# Patient Record
Sex: FEMALE | Race: WHITE
[De-identification: names, ages, dates, MRNs, and addresses within clinical notes are randomized per-mention and may not be internally consistent; named-entity substitution may affect disease eponyms.]

---

## 2017-01-10 PROBLEM — L84 CORN OF TOE: Status: ACTIVE | Noted: 2017-01-10

## 2017-01-10 PROBLEM — M24.574 CONTRACTURE OF JOINT OF FOOT, RIGHT: Status: ACTIVE | Noted: 2017-01-10

## 2017-01-10 PROBLEM — M20.41 HAMMER TOE OF RIGHT FOOT: Status: ACTIVE | Noted: 2017-01-10

## 2018-05-07 LAB
ANALYZER ANC (IANC): NORMAL
ANION GAP SERPL CALC-SCNC: 9 MMOL/L (ref 10–20)
BASOPHILS # BLD: 0 THOUSAND/MCL (ref 0–0.3)
BASOPHILS NFR BLD: 0 %
BUN SERPL-MCNC: 13 MG/DL (ref 6–20)
BUN/CREAT SERPL: 17 (ref 7–25)
CALCIUM SERPL-MCNC: 9.2 MG/DL (ref 8.4–10.2)
CHLORIDE: 105 MMOL/L (ref 98–107)
CO2 SERPL-SCNC: 30 MMOL/L (ref 21–32)
CREAT SERPL-MCNC: 0.77 MG/DL (ref 0.51–0.95)
DIFFERENTIAL METHOD BLD: NORMAL
EOSINOPHIL # BLD: 0.1 THOUSAND/MCL (ref 0.1–0.5)
EOSINOPHIL NFR BLD: 1 %
ERYTHROCYTE [DISTWIDTH] IN BLOOD: 13.8 % (ref 11–15)
GLUCOSE SERPL-MCNC: 113 MG/DL (ref 65–99)
HEMATOCRIT: 39.8 % (ref 36–46.5)
HGB BLD-MCNC: 13.3 GM/DL (ref 12–15.5)
LYMPHOCYTES # BLD: 1.1 THOUSAND/MCL (ref 1–4)
LYMPHOCYTES NFR BLD: 15 %
MAGNESIUM SERPL-MCNC: 2.1 MG/DL (ref 1.7–2.4)
MCH RBC QN AUTO: 30.7 PG (ref 26–34)
MCHC RBC AUTO-ENTMCNC: 33.4 GM/DL (ref 32–36.5)
MCV RBC AUTO: 91.9 FL (ref 78–100)
MONOCYTES # BLD: 0.7 THOUSAND/MCL (ref 0.3–0.9)
MONOCYTES NFR BLD: 10 %
NEUTROPHILS # BLD: 5.1 THOUSAND/MCL (ref 1.8–7.7)
NEUTROPHILS NFR BLD: 74 %
NEUTS SEG NFR BLD: NORMAL %
PERCENT NRBC: NORMAL
PLATELET # BLD: 209 THOUSAND/MCL (ref 140–450)
POTASSIUM SERPL-SCNC: 4.1 MMOL/L (ref 3.4–5.1)
PROCALCITONIN SERPL IA-MCNC: <0.05 NG/ML
RBC # BLD: 4.33 MILLION/MCL (ref 4–5.2)
SODIUM SERPL-SCNC: 140 MMOL/L (ref 135–145)
WBC # BLD: 7 THOUSAND/MCL (ref 4.2–11)

## 2018-05-08 ENCOUNTER — HOSPITAL (OUTPATIENT)
Dept: OTHER | Age: 83
End: 2018-05-08
Attending: HOSPITALIST

## 2018-05-08 LAB
ANALYZER ANC (IANC): NORMAL
BASOPHILS # BLD: 0 THOUSAND/MCL (ref 0–0.3)
BASOPHILS NFR BLD: 0 %
DIFFERENTIAL METHOD BLD: NORMAL
EOSINOPHIL # BLD: 0.1 THOUSAND/MCL (ref 0.1–0.5)
EOSINOPHIL NFR BLD: 2 %
ERYTHROCYTE [DISTWIDTH] IN BLOOD: 13.6 % (ref 11–15)
HEMATOCRIT: 36.5 % (ref 36–46.5)
HGB BLD-MCNC: 12 GM/DL (ref 12–15.5)
LYMPHOCYTES # BLD: 1.9 THOUSAND/MCL (ref 1–4)
LYMPHOCYTES NFR BLD: 27 %
MCH RBC QN AUTO: 29.9 PG (ref 26–34)
MCHC RBC AUTO-ENTMCNC: 32.9 GM/DL (ref 32–36.5)
MCV RBC AUTO: 90.8 FL (ref 78–100)
MONOCYTES # BLD: 0.7 THOUSAND/MCL (ref 0.3–0.9)
MONOCYTES NFR BLD: 10 %
NEUTROPHILS # BLD: 4.3 THOUSAND/MCL (ref 1.8–7.7)
NEUTROPHILS NFR BLD: 61 %
NEUTS SEG NFR BLD: NORMAL %
PERCENT NRBC: NORMAL
PLATELET # BLD: 187 THOUSAND/MCL (ref 140–450)
PROCALCITONIN SERPL IA-MCNC: <0.05 NG/ML
RBC # BLD: 4.02 MILLION/MCL (ref 4–5.2)
WBC # BLD: 7 THOUSAND/MCL (ref 4.2–11)

## 2018-06-05 ENCOUNTER — HOSPITAL (OUTPATIENT)
Dept: OTHER | Age: 83
End: 2018-06-05
Attending: EMERGENCY MEDICINE

## 2018-06-05 LAB
ALBUMIN SERPL-MCNC: 4 GM/DL (ref 3.6–5.1)
ALBUMIN/GLOB SERPL: 1 {RATIO} (ref 1–2.4)
ALP SERPL-CCNC: 97 UNIT/L (ref 45–117)
ALT SERPL-CCNC: 26 UNIT/L
ANALYZER ANC (IANC): NORMAL
ANION GAP SERPL CALC-SCNC: 12 MMOL/L (ref 10–20)
AST SERPL-CCNC: 26 UNIT/L
BASOPHILS # BLD: 0 THOUSAND/MCL (ref 0–0.3)
BASOPHILS NFR BLD: 1 %
BILIRUB SERPL-MCNC: 0.6 MG/DL (ref 0.2–1)
BUN SERPL-MCNC: 13 MG/DL (ref 6–20)
BUN/CREAT SERPL: 15 (ref 7–25)
CALCIUM SERPL-MCNC: 9.6 MG/DL (ref 8.4–10.2)
CHLORIDE: 103 MMOL/L (ref 98–107)
CO2 SERPL-SCNC: 29 MMOL/L (ref 21–32)
CREAT SERPL-MCNC: 0.89 MG/DL (ref 0.51–0.95)
DIFFERENTIAL METHOD BLD: NORMAL
EOSINOPHIL # BLD: 0.2 THOUSAND/MCL (ref 0.1–0.5)
EOSINOPHIL NFR BLD: 3 %
ERYTHROCYTE [DISTWIDTH] IN BLOOD: 13.4 % (ref 11–15)
GLOBULIN SER-MCNC: 4.2 GM/DL (ref 2–4)
GLUCOSE SERPL-MCNC: 102 MG/DL (ref 65–99)
HEMATOCRIT: 42.7 % (ref 36–46.5)
HGB BLD-MCNC: 14.1 GM/DL (ref 12–15.5)
LYMPHOCYTES # BLD: 1.8 THOUSAND/MCL (ref 1–4)
LYMPHOCYTES NFR BLD: 24 %
MCH RBC QN AUTO: 30.2 PG (ref 26–34)
MCHC RBC AUTO-ENTMCNC: 33 GM/DL (ref 32–36.5)
MCV RBC AUTO: 91.4 FL (ref 78–100)
MONOCYTES # BLD: 0.8 THOUSAND/MCL (ref 0.3–0.9)
MONOCYTES NFR BLD: 10 %
NEUTROPHILS # BLD: 4.5 THOUSAND/MCL (ref 1.8–7.7)
NEUTROPHILS NFR BLD: 62 %
NEUTS SEG NFR BLD: NORMAL %
NRBC (NRBCRE): NORMAL
PLATELET # BLD: 218 THOUSAND/MCL (ref 140–450)
POTASSIUM SERPL-SCNC: 4.4 MMOL/L (ref 3.4–5.1)
PROT SERPL-MCNC: 8.2 GM/DL (ref 6.4–8.2)
RBC # BLD: 4.67 MILLION/MCL (ref 4–5.2)
SODIUM SERPL-SCNC: 140 MMOL/L (ref 135–145)
WBC # BLD: 7.3 THOUSAND/MCL (ref 4.2–11)

## 2019-09-27 ENCOUNTER — HOSPITAL (OUTPATIENT)
Dept: OTHER | Age: 84
End: 2019-09-27

## 2021-01-01 ENCOUNTER — APPOINTMENT (OUTPATIENT)
Dept: CT IMAGING | Facility: HOSPITAL | Age: 86
DRG: 835 | End: 2021-01-01
Attending: EMERGENCY MEDICINE
Payer: MEDICARE

## 2021-01-01 ENCOUNTER — HOSPITAL ENCOUNTER (INPATIENT)
Facility: HOSPITAL | Age: 86
LOS: 8 days | Discharge: HOSPICE | DRG: 835 | End: 2021-01-01
Attending: EMERGENCY MEDICINE | Admitting: INTERNAL MEDICINE
Payer: MEDICARE

## 2021-01-01 ENCOUNTER — TELEPHONE (OUTPATIENT)
Dept: HEMATOLOGY/ONCOLOGY | Facility: HOSPITAL | Age: 86
End: 2021-01-01

## 2021-01-01 ENCOUNTER — APPOINTMENT (OUTPATIENT)
Dept: GENERAL RADIOLOGY | Facility: HOSPITAL | Age: 86
DRG: 835 | End: 2021-01-01
Attending: EMERGENCY MEDICINE
Payer: MEDICARE

## 2021-01-01 VITALS
DIASTOLIC BLOOD PRESSURE: 53 MMHG | TEMPERATURE: 98 F | OXYGEN SATURATION: 98 % | HEART RATE: 86 BPM | RESPIRATION RATE: 18 BRPM | BODY MASS INDEX: 25.19 KG/M2 | WEIGHT: 120 LBS | SYSTOLIC BLOOD PRESSURE: 121 MMHG | HEIGHT: 58 IN

## 2021-01-01 DIAGNOSIS — D64.9 SYMPTOMATIC ANEMIA: ICD-10-CM

## 2021-01-01 DIAGNOSIS — D61.818 PANCYTOPENIA (HCC): Primary | ICD-10-CM

## 2021-01-01 LAB
ALBUMIN SERPL-MCNC: 3.1 G/DL (ref 3.4–5)
ALBUMIN/GLOB SERPL: 0.8 {RATIO} (ref 1–2)
ALP LIVER SERPL-CCNC: 92 U/L
ALT SERPL-CCNC: 13 U/L
ANION GAP SERPL CALC-SCNC: 5 MMOL/L (ref 0–18)
ANION GAP SERPL CALC-SCNC: 6 MMOL/L (ref 0–18)
ANTIBODY SCREEN: NEGATIVE
ANTIBODY SCREEN: NEGATIVE
APTT PPP: 32.3 SECONDS (ref 25.4–36.1)
AST SERPL-CCNC: 14 U/L (ref 15–37)
ATRIAL RATE: 86 BPM
BASOPHILS # BLD AUTO: 0 X10(3) UL (ref 0–0.2)
BASOPHILS # BLD AUTO: 0.01 X10(3) UL (ref 0–0.2)
BASOPHILS # BLD: 0 X10(3) UL (ref 0–0.2)
BASOPHILS # BLD: 0 X10(3) UL (ref 0–0.2)
BASOPHILS # BLD: 0.04 X10(3) UL (ref 0–0.2)
BASOPHILS NFR BLD AUTO: 0 %
BASOPHILS NFR BLD AUTO: 0.7 %
BASOPHILS NFR BLD AUTO: 0.7 %
BASOPHILS NFR BLD AUTO: 1.1 %
BASOPHILS NFR BLD: 0 %
BASOPHILS NFR BLD: 0 %
BASOPHILS NFR BLD: 4 %
BILIRUB SERPL-MCNC: 0.6 MG/DL (ref 0.1–2)
BLASTS # BLD: 0.02 X10(3) UL
BLASTS # BLD: 0.03 X10(3) UL
BLASTS # BLD: 0.04 X10(3) UL
BLASTS NFR BLD: 2 %
BLASTS NFR BLD: 2 %
BLASTS NFR BLD: 4 %
BLOOD TYPE BARCODE: 5100
BUN BLD-MCNC: 14 MG/DL (ref 7–18)
BUN BLD-MCNC: 19 MG/DL (ref 7–18)
BUN/CREAT SERPL: 19.7 (ref 10–20)
BUN/CREAT SERPL: 28.4 (ref 10–20)
CALCIUM BLD-MCNC: 8.5 MG/DL (ref 8.5–10.1)
CALCIUM BLD-MCNC: 8.6 MG/DL (ref 8.5–10.1)
CHLORIDE SERPL-SCNC: 107 MMOL/L (ref 98–112)
CHLORIDE SERPL-SCNC: 111 MMOL/L (ref 98–112)
CO2 SERPL-SCNC: 24 MMOL/L (ref 21–32)
CO2 SERPL-SCNC: 26 MMOL/L (ref 21–32)
CREAT BLD-MCNC: 0.67 MG/DL
CREAT BLD-MCNC: 0.71 MG/DL
D-DIMER: 2.38 UG/ML FEU (ref ?–0.89)
DEPRECATED HBV CORE AB SER IA-ACNC: 113.8 NG/ML
DEPRECATED RDW RBC AUTO: 62.3 FL (ref 35.1–46.3)
DEPRECATED RDW RBC AUTO: 64.4 FL (ref 35.1–46.3)
DEPRECATED RDW RBC AUTO: 64.8 FL (ref 35.1–46.3)
DEPRECATED RDW RBC AUTO: 65 FL (ref 35.1–46.3)
DEPRECATED RDW RBC AUTO: 67.3 FL (ref 35.1–46.3)
DEPRECATED RDW RBC AUTO: 68.6 FL (ref 35.1–46.3)
DEPRECATED RDW RBC AUTO: 69.7 FL (ref 35.1–46.3)
DEPRECATED RDW RBC AUTO: 79.1 FL (ref 35.1–46.3)
EOSINOPHIL # BLD AUTO: 0 X10(3) UL (ref 0–0.7)
EOSINOPHIL # BLD AUTO: 0.01 X10(3) UL (ref 0–0.7)
EOSINOPHIL # BLD: 0 X10(3) UL (ref 0–0.7)
EOSINOPHIL NFR BLD AUTO: 0 %
EOSINOPHIL NFR BLD AUTO: 1.1 %
EOSINOPHIL NFR BLD: 0 %
ERYTHROCYTE [DISTWIDTH] IN BLOOD BY AUTOMATED COUNT: 19.2 % (ref 11–15)
ERYTHROCYTE [DISTWIDTH] IN BLOOD BY AUTOMATED COUNT: 19.5 % (ref 11–15)
ERYTHROCYTE [DISTWIDTH] IN BLOOD BY AUTOMATED COUNT: 20 % (ref 11–15)
ERYTHROCYTE [DISTWIDTH] IN BLOOD BY AUTOMATED COUNT: 20.6 % (ref 11–15)
ERYTHROCYTE [DISTWIDTH] IN BLOOD BY AUTOMATED COUNT: 20.9 % (ref 11–15)
ERYTHROCYTE [DISTWIDTH] IN BLOOD BY AUTOMATED COUNT: 21.1 % (ref 11–15)
ERYTHROCYTE [DISTWIDTH] IN BLOOD BY AUTOMATED COUNT: 21.5 % (ref 11–15)
ERYTHROCYTE [DISTWIDTH] IN BLOOD BY AUTOMATED COUNT: 23.2 % (ref 11–15)
FOLATE SERPL-MCNC: 8.5 NG/ML (ref 8.7–?)
GLOBULIN PLAS-MCNC: 4.1 G/DL (ref 2.8–4.4)
GLUCOSE BLD-MCNC: 115 MG/DL (ref 70–99)
GLUCOSE BLD-MCNC: 95 MG/DL (ref 70–99)
HCT VFR BLD AUTO: 17.7 %
HCT VFR BLD AUTO: 19.5 %
HCT VFR BLD AUTO: 21.9 %
HCT VFR BLD AUTO: 22.4 %
HCT VFR BLD AUTO: 22.6 %
HCT VFR BLD AUTO: 23 %
HCT VFR BLD AUTO: 23.1 %
HCT VFR BLD AUTO: 24.7 %
HCT VFR BLD AUTO: 26 %
HGB BLD-MCNC: 5.7 G/DL
HGB BLD-MCNC: 6.3 G/DL
HGB BLD-MCNC: 7.1 G/DL
HGB BLD-MCNC: 7.2 G/DL
HGB BLD-MCNC: 7.3 G/DL
HGB BLD-MCNC: 7.4 G/DL
HGB BLD-MCNC: 7.6 G/DL
HGB BLD-MCNC: 7.8 G/DL
HGB BLD-MCNC: 8.3 G/DL
HGB RETIC QN AUTO: 37.8 PG (ref 28.2–36.6)
IMM GRANULOCYTES # BLD AUTO: 0.01 X10(3) UL (ref 0–1)
IMM GRANULOCYTES # BLD AUTO: 0.07 X10(3) UL (ref 0–1)
IMM GRANULOCYTES NFR BLD: 0.7 %
IMM GRANULOCYTES NFR BLD: 1.1 %
IMM GRANULOCYTES NFR BLD: 1.1 %
IMM GRANULOCYTES NFR BLD: 4.6 %
IMM RETICS NFR: 0.19 RATIO (ref 0.1–0.3)
INR BLD: 1.25 (ref 0.89–1.11)
IRON SATURATION: 18 %
IRON SERPL-MCNC: 62 UG/DL
LYMPHOCYTES # BLD AUTO: 0.44 X10(3) UL (ref 1–4)
LYMPHOCYTES # BLD AUTO: 0.45 X10(3) UL (ref 1–4)
LYMPHOCYTES # BLD AUTO: 0.54 X10(3) UL (ref 1–4)
LYMPHOCYTES # BLD AUTO: 0.56 X10(3) UL (ref 1–4)
LYMPHOCYTES NFR BLD AUTO: 35.5 %
LYMPHOCYTES NFR BLD AUTO: 37.1 %
LYMPHOCYTES NFR BLD AUTO: 48.9 %
LYMPHOCYTES NFR BLD AUTO: 50.6 %
LYMPHOCYTES NFR BLD: 0.54 X10(3) UL (ref 1–4)
LYMPHOCYTES NFR BLD: 0.78 X10(3) UL (ref 1–4)
LYMPHOCYTES NFR BLD: 0.81 X10(3) UL (ref 1–4)
LYMPHOCYTES NFR BLD: 58 %
LYMPHOCYTES NFR BLD: 60 %
LYMPHOCYTES NFR BLD: 65 %
M PROTEIN MFR SERPL ELPH: 7.2 G/DL (ref 6.4–8.2)
MCH RBC QN AUTO: 29.2 PG (ref 26–34)
MCH RBC QN AUTO: 29.4 PG (ref 26–34)
MCH RBC QN AUTO: 29.5 PG (ref 26–34)
MCH RBC QN AUTO: 29.6 PG (ref 26–34)
MCH RBC QN AUTO: 29.6 PG (ref 26–34)
MCH RBC QN AUTO: 29.7 PG (ref 26–34)
MCH RBC QN AUTO: 29.8 PG (ref 26–34)
MCH RBC QN AUTO: 30.6 PG (ref 26–34)
MCHC RBC AUTO-ENTMCNC: 31.6 G/DL (ref 31–37)
MCHC RBC AUTO-ENTMCNC: 31.9 G/DL (ref 31–37)
MCHC RBC AUTO-ENTMCNC: 32 G/DL (ref 31–37)
MCHC RBC AUTO-ENTMCNC: 32.1 G/DL (ref 31–37)
MCHC RBC AUTO-ENTMCNC: 32.2 G/DL (ref 31–37)
MCHC RBC AUTO-ENTMCNC: 32.3 G/DL (ref 31–37)
MCHC RBC AUTO-ENTMCNC: 32.4 G/DL (ref 31–37)
MCHC RBC AUTO-ENTMCNC: 33 G/DL (ref 31–37)
MCV RBC AUTO: 90.2 FL
MCV RBC AUTO: 91.3 FL
MCV RBC AUTO: 91.5 FL
MCV RBC AUTO: 91.8 FL
MCV RBC AUTO: 91.9 FL
MCV RBC AUTO: 92.4 FL
MCV RBC AUTO: 93.2 FL
MCV RBC AUTO: 95.2 FL
METAMYELOCYTES # BLD: 0.03 X10(3) UL
METAMYELOCYTES # BLD: 0.04 X10(3) UL
METAMYELOCYTES NFR BLD: 2 %
METAMYELOCYTES NFR BLD: 4 %
MONOCYTES # BLD AUTO: 0.24 X10(3) UL (ref 0.1–1)
MONOCYTES # BLD AUTO: 0.25 X10(3) UL (ref 0.1–1)
MONOCYTES # BLD AUTO: 0.41 X10(3) UL (ref 0.1–1)
MONOCYTES # BLD AUTO: 0.45 X10(3) UL (ref 0.1–1)
MONOCYTES # BLD: 0.04 X10(3) UL (ref 0.1–1)
MONOCYTES # BLD: 0.08 X10(3) UL (ref 0.1–1)
MONOCYTES # BLD: 0.11 X10(3) UL (ref 0.1–1)
MONOCYTES NFR BLD AUTO: 27 %
MONOCYTES NFR BLD AUTO: 27.2 %
MONOCYTES NFR BLD AUTO: 27.6 %
MONOCYTES NFR BLD AUTO: 29.8 %
MONOCYTES NFR BLD: 4 %
MONOCYTES NFR BLD: 6 %
MONOCYTES NFR BLD: 9 %
MORPHOLOGY: NORMAL
NEUTROPHILS # BLD AUTO: 0.17 X10 (3) UL (ref 1.5–7.7)
NEUTROPHILS # BLD AUTO: 0.18 X10 (3) UL (ref 1.5–7.7)
NEUTROPHILS # BLD AUTO: 0.18 X10(3) UL (ref 1.5–7.7)
NEUTROPHILS # BLD AUTO: 0.19 X10 (3) UL (ref 1.5–7.7)
NEUTROPHILS # BLD AUTO: 0.19 X10(3) UL (ref 1.5–7.7)
NEUTROPHILS # BLD AUTO: 0.32 X10 (3) UL (ref 1.5–7.7)
NEUTROPHILS # BLD AUTO: 0.4 X10 (3) UL (ref 1.5–7.7)
NEUTROPHILS # BLD AUTO: 0.48 X10 (3) UL (ref 1.5–7.7)
NEUTROPHILS # BLD AUTO: 0.48 X10(3) UL (ref 1.5–7.7)
NEUTROPHILS # BLD AUTO: 0.49 X10 (3) UL (ref 1.5–7.7)
NEUTROPHILS # BLD AUTO: 0.49 X10(3) UL (ref 1.5–7.7)
NEUTROPHILS NFR BLD AUTO: 20.6 %
NEUTROPHILS NFR BLD AUTO: 20.7 %
NEUTROPHILS NFR BLD AUTO: 31.7 %
NEUTROPHILS NFR BLD AUTO: 32.2 %
NEUTROPHILS NFR BLD: 20 %
NEUTROPHILS NFR BLD: 24 %
NEUTROPHILS NFR BLD: 31 %
NEUTS BAND NFR BLD: 1 %
NEUTS BAND NFR BLD: 4 %
NEUTS HYPERSEG # BLD: 0.22 X10(3) UL (ref 1.5–7.7)
NEUTS HYPERSEG # BLD: 0.29 X10(3) UL (ref 1.5–7.7)
NEUTS HYPERSEG # BLD: 0.45 X10(3) UL (ref 1.5–7.7)
NRBC BLD MANUAL-RTO: 2 %
OSMOLALITY SERPL CALC.SUM OF ELEC: 290 MOSM/KG (ref 275–295)
OSMOLALITY SERPL CALC.SUM OF ELEC: 291 MOSM/KG (ref 275–295)
P AXIS: 50 DEGREES
P-R INTERVAL: 190 MS
PLATELET # BLD AUTO: 14 10(3)UL (ref 150–450)
PLATELET # BLD AUTO: 15 10(3)UL (ref 150–450)
PLATELET # BLD AUTO: 16 10(3)UL (ref 150–450)
PLATELET # BLD AUTO: 17 10(3)UL (ref 150–450)
PLATELET # BLD AUTO: 23 10(3)UL (ref 150–450)
PLATELET # BLD AUTO: 39 10(3)UL (ref 150–450)
PLATELET # BLD AUTO: 43 10(3)UL (ref 150–450)
PLATELET # BLD AUTO: 46 10(3)UL (ref 150–450)
PLATELET MORPHOLOGY: NORMAL
POTASSIUM SERPL-SCNC: 4.2 MMOL/L (ref 3.5–5.1)
POTASSIUM SERPL-SCNC: 4.4 MMOL/L (ref 3.5–5.1)
PSA SERPL DL<=0.01 NG/ML-MCNC: 16.1 SECONDS (ref 12.4–14.6)
Q-T INTERVAL: 360 MS
QRS DURATION: 66 MS
QTC CALCULATION (BEZET): 430 MS
R AXIS: 11 DEGREES
RBC # BLD AUTO: 1.86 X10(6)UL
RBC # BLD AUTO: 2.4 X10(6)UL
RBC # BLD AUTO: 2.44 X10(6)UL
RBC # BLD AUTO: 2.46 X10(6)UL
RBC # BLD AUTO: 2.5 X10(6)UL
RBC # BLD AUTO: 2.55 X10(6)UL
RBC # BLD AUTO: 2.65 X10(6)UL
RBC # BLD AUTO: 2.84 X10(6)UL
RETICS # AUTO: 39.7 X10(3) UL (ref 22.5–147.5)
RETICS/RBC NFR AUTO: 1.9 %
RH BLOOD TYPE: POSITIVE
RH BLOOD TYPE: POSITIVE
SARS-COV-2 RNA RESP QL NAA+PROBE: NOT DETECTED
SODIUM SERPL-SCNC: 139 MMOL/L (ref 136–145)
SODIUM SERPL-SCNC: 140 MMOL/L (ref 136–145)
T AXIS: 49 DEGREES
TOTAL CELLS COUNTED: 100
TOTAL CELLS COUNTED: 100
TOTAL CELLS COUNTED: 25
TOTAL IRON BINDING CAPACITY: 343 UG/DL (ref 240–450)
TRANSFERRIN SERPL-MCNC: 230 MG/DL (ref 200–360)
TROPONIN I SERPL-MCNC: <0.045 NG/ML (ref ?–0.04)
TSI SER-ACNC: 1.11 MIU/ML (ref 0.36–3.74)
VENTRICULAR RATE: 86 BPM
VIT B12 SERPL-MCNC: 614 PG/ML (ref 193–986)
WBC # BLD AUTO: 0.9 X10(3) UL (ref 4–11)
WBC # BLD AUTO: 1.2 X10(3) UL (ref 4–11)
WBC # BLD AUTO: 1.4 X10(3) UL (ref 4–11)
WBC # BLD AUTO: 1.5 X10(3) UL (ref 4–11)
WBC # BLD AUTO: 1.5 X10(3) UL (ref 4–11)

## 2021-01-01 PROCEDURE — 99223 1ST HOSP IP/OBS HIGH 75: CPT | Performed by: INTERNAL MEDICINE

## 2021-01-01 PROCEDURE — 88184 FLOWCYTOMETRY/ TC 1 MARKER: CPT | Performed by: INTERNAL MEDICINE

## 2021-01-01 PROCEDURE — 99232 SBSQ HOSP IP/OBS MODERATE 35: CPT | Performed by: INTERNAL MEDICINE

## 2021-01-01 PROCEDURE — 99239 HOSP IP/OBS DSCHRG MGMT >30: CPT | Performed by: HOSPITALIST

## 2021-01-01 PROCEDURE — 88291 CYTO/MOLECULAR REPORT: CPT | Performed by: INTERNAL MEDICINE

## 2021-01-01 PROCEDURE — 88264 CHROMOSOME ANALYSIS 20-25: CPT | Performed by: INTERNAL MEDICINE

## 2021-01-01 PROCEDURE — 99232 SBSQ HOSP IP/OBS MODERATE 35: CPT | Performed by: NURSE PRACTITIONER

## 2021-01-01 PROCEDURE — 88237 TISSUE CULTURE BONE MARROW: CPT | Performed by: INTERNAL MEDICINE

## 2021-01-01 PROCEDURE — 71275 CT ANGIOGRAPHY CHEST: CPT | Performed by: EMERGENCY MEDICINE

## 2021-01-01 PROCEDURE — 74177 CT ABD & PELVIS W/CONTRAST: CPT | Performed by: EMERGENCY MEDICINE

## 2021-01-01 PROCEDURE — 99233 SBSQ HOSP IP/OBS HIGH 50: CPT | Performed by: INTERNAL MEDICINE

## 2021-01-01 PROCEDURE — 30233N1 TRANSFUSION OF NONAUTOLOGOUS RED BLOOD CELLS INTO PERIPHERAL VEIN, PERCUTANEOUS APPROACH: ICD-10-PCS | Performed by: HOSPITALIST

## 2021-01-01 PROCEDURE — 38222 DX BONE MARROW BX & ASPIR: CPT | Performed by: INTERNAL MEDICINE

## 2021-01-01 PROCEDURE — 99232 SBSQ HOSP IP/OBS MODERATE 35: CPT | Performed by: HOSPITALIST

## 2021-01-01 PROCEDURE — 88185 FLOWCYTOMETRY/TC ADD-ON: CPT | Performed by: INTERNAL MEDICINE

## 2021-01-01 PROCEDURE — 90792 PSYCH DIAG EVAL W/MED SRVCS: CPT | Performed by: OTHER

## 2021-01-01 PROCEDURE — 07DR3ZX EXTRACTION OF ILIAC BONE MARROW, PERCUTANEOUS APPROACH, DIAGNOSTIC: ICD-10-PCS | Performed by: INTERNAL MEDICINE

## 2021-01-01 PROCEDURE — 71045 X-RAY EXAM CHEST 1 VIEW: CPT | Performed by: EMERGENCY MEDICINE

## 2021-01-01 PROCEDURE — 99233 SBSQ HOSP IP/OBS HIGH 50: CPT | Performed by: HOSPITALIST

## 2021-01-01 PROCEDURE — 99232 SBSQ HOSP IP/OBS MODERATE 35: CPT | Performed by: OTHER

## 2021-01-01 RX ORDER — LIDOCAINE HYDROCHLORIDE 20 MG/ML
20 INJECTION, SOLUTION EPIDURAL; INFILTRATION; INTRACAUDAL; PERINEURAL ONCE
Status: DISCONTINUED | OUTPATIENT
Start: 2021-01-01 | End: 2021-01-01

## 2021-01-01 RX ORDER — SODIUM CHLORIDE 9 MG/ML
INJECTION, SOLUTION INTRAVENOUS ONCE
Status: COMPLETED | OUTPATIENT
Start: 2021-01-01 | End: 2021-01-01

## 2021-01-01 RX ORDER — ACETAMINOPHEN 325 MG/1
650 TABLET ORAL ONCE
Status: COMPLETED | OUTPATIENT
Start: 2021-01-01 | End: 2021-01-01

## 2021-01-01 RX ORDER — ATORVASTATIN CALCIUM 10 MG/1
10 TABLET, FILM COATED ORAL NIGHTLY
Status: DISCONTINUED | OUTPATIENT
Start: 2021-01-01 | End: 2021-01-01

## 2021-01-01 RX ORDER — PANTOPRAZOLE SODIUM 40 MG/1
40 TABLET, DELAYED RELEASE ORAL
Status: DISCONTINUED | OUTPATIENT
Start: 2021-01-01 | End: 2021-01-01

## 2021-01-01 RX ORDER — DIPHENHYDRAMINE HCL 25 MG
25 CAPSULE ORAL ONCE
Status: COMPLETED | OUTPATIENT
Start: 2021-01-01 | End: 2021-01-01

## 2021-01-01 RX ORDER — MIRTAZAPINE 15 MG/1
15 TABLET, FILM COATED ORAL NIGHTLY
Status: DISCONTINUED | OUTPATIENT
Start: 2021-01-01 | End: 2021-01-01

## 2021-01-01 RX ORDER — DONEPEZIL HYDROCHLORIDE 10 MG/1
10 TABLET, FILM COATED ORAL NIGHTLY
Status: DISCONTINUED | OUTPATIENT
Start: 2021-01-01 | End: 2021-01-01

## 2021-01-01 RX ORDER — HEPARIN SODIUM 1000 [USP'U]/ML
1000 INJECTION, SOLUTION INTRAVENOUS; SUBCUTANEOUS ONCE
Status: DISCONTINUED | OUTPATIENT
Start: 2021-01-01 | End: 2021-01-01

## 2021-01-01 RX ORDER — MIRTAZAPINE 7.5 MG/1
15 TABLET, FILM COATED ORAL NIGHTLY
COMMUNITY

## 2021-01-01 RX ORDER — HYDROCODONE BITARTRATE AND ACETAMINOPHEN 5; 325 MG/1; MG/1
1 TABLET ORAL ONCE
Status: DISCONTINUED | OUTPATIENT
Start: 2021-01-01 | End: 2021-01-01

## 2021-01-01 RX ORDER — ALPRAZOLAM 0.25 MG/1
0.25 TABLET ORAL 2 TIMES DAILY PRN
Status: DISCONTINUED | OUTPATIENT
Start: 2021-01-01 | End: 2021-01-01

## 2021-01-01 RX ORDER — METOPROLOL SUCCINATE 50 MG/1
100 TABLET, EXTENDED RELEASE ORAL
Status: DISCONTINUED | OUTPATIENT
Start: 2021-01-01 | End: 2021-01-01

## 2021-01-01 RX ORDER — FOLIC ACID 1 MG/1
1 TABLET ORAL DAILY
Status: DISCONTINUED | OUTPATIENT
Start: 2021-01-01 | End: 2021-01-01

## 2021-01-01 RX ORDER — SODIUM CHLORIDE 9 MG/ML
INJECTION, SOLUTION INTRAVENOUS CONTINUOUS
Status: DISCONTINUED | OUTPATIENT
Start: 2021-01-01 | End: 2021-01-01

## 2021-01-01 RX ORDER — LEVOTHYROXINE SODIUM 0.07 MG/1
75 TABLET ORAL
Status: DISCONTINUED | OUTPATIENT
Start: 2021-01-01 | End: 2021-01-01

## 2021-01-01 RX ORDER — DONEPEZIL HYDROCHLORIDE 10 MG/1
10 TABLET, FILM COATED ORAL NIGHTLY
COMMUNITY

## 2021-01-01 RX ORDER — HYDROCODONE BITARTRATE AND ACETAMINOPHEN 5; 325 MG/1; MG/1
1 TABLET ORAL ONCE
Qty: 20 TABLET | Refills: 0 | Status: SHIPPED | OUTPATIENT
Start: 2021-01-01 | End: 2021-01-01

## 2021-01-01 RX ORDER — FLUTICASONE PROPIONATE 50 MCG
1 SPRAY, SUSPENSION (ML) NASAL DAILY
Status: DISCONTINUED | OUTPATIENT
Start: 2021-01-01 | End: 2021-01-01

## 2021-02-12 PROBLEM — D61.818 PANCYTOPENIA (HCC): Status: ACTIVE | Noted: 2021-01-01

## 2021-02-12 PROBLEM — D64.9 SYMPTOMATIC ANEMIA: Status: ACTIVE | Noted: 2021-01-01

## 2021-02-12 NOTE — H&P
[facility]  History and Physical     Wallace Hood Patient Status:  Emergency    1931 MRN [id number]   Location [facility] Attending WillHeaven MD   Hosp Day # 0 PCP Lisa Gamble MD [facility]     Chief Complaint: 90 mg by mouth 2 (two) times daily. , Disp: , Rfl:     •  Levothyroxine Sodium 75 MCG Oral Tab, Take 75 mcg by mouth before breakfast., Disp: , Rfl:     •  Metoprolol Succinate  MG Oral Tablet 24 Hr, Take 100 mg by mouth daily. , Disp: , Rfl:     • hours. Recent Labs   Lab 02/12/21  1348   TROP <0.045       Imaging: Imaging data reviewed in Epic. ASSESSMENT / PLAN:     1. Symptomatic anemia   1. PRBC ordred in ER   2. Plan for CT C/A/P  3. PPI IV BID  4. NPO  5. H&H  6.  Iron,ferritin, B12, fo

## 2021-02-12 NOTE — ED INITIAL ASSESSMENT (HPI)
PT PRESENTS TO ED WITH SHORTNESS OF BREATH. PER DAUGHTER PT WAS SEEN BY HER PHYSICAL THERAPIST AT HOME AND WAS SENT HERE FOR INCREASED WORK OF BREATHING. PT DENIES PAIN, STATES SOB IS CHRONIC, WORSENING THE PAST WEEK.   DENIES HOME O2 USE

## 2021-02-12 NOTE — ED PROVIDER NOTES
Patient Seen in: [facility] Emergency Department      History   Patient presents with:  Difficulty Breathing    Stated Complaint: SOB     HPI/Subjective:   HPI    The patient is an 60-year-old female presenting to the emergency department accompanie Vitals [02/12/21 1338]   /64   Pulse 83   Resp 19   Temp 99.6 °F (37.6 °C)   Temp src Temporal   SpO2 99 %   O2 Device None (Room air)       Current:/51 (BP Location: Left arm)   Pulse 85   Temp 99 °F (37.2 °C) (Oral)   Resp 21   Ht 147.3 cm (4 other components within normal limits   MANUAL DIFFERENTIAL - Abnormal; Notable for the following components:    Neutrophil Absolute Manual 0.45 (*)     Lymphocyte Absolute Manual 0.81 (*)     Monocyte Absolute Manual 0.08 (*)     Metamyelocyte Absolute Ma Final result                 Please view results for these tests on the individual orders.    PREPARE RBC   ABORH (BLOOD TYPE)   ANTIBODY SCREEN   RAINBOW DRAW BLUE   RAINBOW DRAW LAVENDER   RAINBOW DRAW LIGHT GREEN   RAINBOW DRAW GOLD     EKG    Rate, inte 2/12/2021          ICD-10-CM Noted POA    * (Principal) Pancytopenia ([facility] [address] D61.818 2/12/2021 Unknown    Symptomatic anemia D64.9 2/12/2021 Unknown

## 2021-02-13 NOTE — PLAN OF CARE
A/Ox4. Can be forgetful at times. Craig. On RA. Tele-NSR. Hgb 8.3 s/p 2 units of PRBCs. GI and Heme/onc to eval.  Denies any pain or needs at this time. Daughter at bedside. Staff will cont to monitor.     Problem: Patient/Family Goals  Goal: Patient/Fa needed  - Optimize oral hygiene and moisture  - Encourage food from home; allow for food preferences  - Enhance eating environment  Outcome: Progressing  Goal: Achieves appropriate nutritional intake (bariatric)  Description: INTERVENTIONS:  - Monitor for

## 2021-02-13 NOTE — PROGRESS NOTES
NURSING ADMISSION NOTE      Patient admitted via Cart  Oriented to room. Safety precautions initiated. Bed in low position. Call light in reach. PATIENT ARRIVED TO THE FLOOR VIA CART FROM ER IN STABLE CONDITION AT 2923. 5914: PAGED DR. [address].

## 2021-02-13 NOTE — PROGRESS NOTES
[facility]  Progress Note     Darnell Reymundo Patient Status:  Inpatient    1931 MRN [id number]   [facility] 3NE-A Attending Wood Santos MD   Hosp Day # 1 PCP Julian Rosa MD [facility]     Chief Complaint: anemia     S: Patient fee 75 mcg Oral Before breakfast   • Donepezil HCl  10 mg Oral Nightly   • Metoprolol Succinate ER  100 mg Oral Daily Beta Blocker   • mirtazapine  15 mg Oral Nightly   • pantoprazole (PROTONIX) IV push  40 mg Intravenous Q12H       ASSESSMENT / PLAN:     1.  Annalise Hackett

## 2021-02-13 NOTE — CM/SW NOTE
02/13/21 1500   CM/SW Referral Data   Referral Source Physician   Reason for Referral Discharge planning   Informant Patient   Patient Info   Patient's Mental Status Alert;Oriented;Memory Impairments   Patient lives with Children   Patient Status Prior

## 2021-02-13 NOTE — PLAN OF CARE
Received patient awake in bed. AOx4. Daughter at bedside. Pt received 1 unit of PRBC. Awaiting Hemog post transfusion. On room air, dim. Lungs. NSR. SCD applied. Electrolyte protocol. NPO. Up with one assist and a walker. 0000: Rpt hGB 6.3.  Dr Aubrey Salazar Identify factors contributing to decreased intake, treat as appropriate  - Assist with meals as needed  - Monitor I&O, WT and lab values  - Obtain nutritional consult as needed  - Optimize oral hygiene and moisture  - Encourage food from home; allow for fo

## 2021-02-13 NOTE — PROGRESS NOTES
Hospitalist paged with critical prelim ANC of 0.49. Hgb-8.3    0925: Pt and dtr very upset that pt has not had anything to eat. Explained to pt and dtr that we are waiting for GI input and mentioned the possibility of a scope.  Dtr and pt asked this RN to

## 2021-02-14 NOTE — BH LEVEL OF CARE ASSESSMENT
Level of Care Assessment Note    General Questions  Precipitating Events: For the past year, the pt has mad multiple medical issues, and her mood has worsened. For the past year, pt has had thoughts of wanting to be dead.  Pt is in the process of selling he wanting to die. \"I don't want to live anymore. There is no purpose in life for me. You have to have a purpose to continue in life, but I don't. I feel empty and alone. \"  Is your experience of thoughts of dying by suicide: Comforting  Protective Factors: Symptoms: No problems reported or observed  Sleep Pattern: Sleeps all night  Number of Sleep Hours: 9 Hours  Use of Sleep Aids: Remeron  Appetite Symptoms: Normal for patient(daughter says pt eats junk food typically, and then says she is not hungry at [facility] ([place]) Impairment  Currently Attending School: No  Employment Status: Retired  Job Issues: (n/a)  Concerns/Conflicts with Social Relationships: No  Decreased Functional Ability: (not reported)  Do you have any prior/current legal concerns?: None  History of Gang [employer]  Level of Consciousness: Alert  Level of Consciousness: Alert  Behavior  Exhibited behavior: Participated    Assessment Summary  Assessment Summary: 79 y/o female currently on the med floor at [facility] for medical issues.  Pt telling JOSE CRUZ and POC.

## 2021-02-14 NOTE — PLAN OF CARE
Received patient awake in bed. AOx4, forgetful. Has early Dementia. On room air, clear lungs, sob with activity. NSR on tele. Slept all night. Continent, up to the bathroom with SBA. Denies any pain. Bone marrow bx on Monday.        Problem: Patie Obtain nutritional consult as needed  - Optimize oral hygiene and moisture  - Encourage food from home; allow for food preferences  - Enhance eating environment  Outcome: Progressing  Goal: Achieves appropriate nutritional intake (bariatric)  Description:

## 2021-02-14 NOTE — PLAN OF CARE
Patient alert and oriented x4, [place], RA, , NSR on tele, RA,   No complaint of pain or discomfort  Up to the bathroom with standby assist, one episode of incontinence in the morning, briefed  Bone marrow biopsy tomorrow, consent signed, CD called to de

## 2021-02-14 NOTE — PROGRESS NOTES
02/14/21 6658   Clinical Encounter Type   Visited With Family; Patient not available   Routine Visit Introduction   Continue Visiting Yes  (patient sleeping.  daughter requested a visit tomorrow am before procedure for prayer and comforting words)   Reji Velez

## 2021-02-14 NOTE — CONSULTS
[address] [facility] Gastroenterology     [facility] Patient Status:  Inpatient    1931 MRN [id number]   [facility] 3NE-A Attending Дмитрий Nam MD   Hosp Day # 1 PCP Jane Alonso mg, Oral, Nightly    •  0.9% NaCl infusion, , Intravenous, Continuous    •  Pantoprazole Sodium (PROTONIX) 40 mg in Sodium Chloride (PF) 0.9 % 10 mL IV push, 40 mg, Intravenous, Q12H    •  [COMPLETED] iohexol (OMNIPAQUE) 350 MG/ML injection 100 mL, 100 mL, Recent Labs   Lab 02/12/21  1348 02/13/21  0833   GLU 95 115*   BUN 19* 14   CREATSERUM 0.67 0.71   GFRAA 90 87   GFRNAA 78 76   CA 8.6 8.5   ALB 3.1*  --     140   K 4.4 4.2    111   CO2 26.0 24.0   ALKPHO 92  --    AST 14*  --    ALT 13 full, she was thankful for the call. GI consult service will signoff at this time. Thank you for allowing us to participate in patient's care. If any new questions or concerns arise, please call the GI service.      Soraya Garcia MD  2/13/2021  Charlotta Kawasaki

## 2021-02-14 NOTE — PROGRESS NOTES
02/14/21 1731   Clinical Encounter Type   Visited With Patient and family together  (daughter at  bedside)   Routine Visit Introduction   Continue Visiting No   Patient's Supportive Strategies/Resources Pt  alert and oriented, receptive to ministry.   Pr

## 2021-02-14 NOTE — PROGRESS NOTES
[facility]    Progress Note    Orion Mckeon Patient Status:  Inpatient    1931 MRN [id number]   [facility] 3NE-A Attending Grady Mendoza MD   Hosp Day # 2 PCP Brina Sierra     Subjective:  Orion Mckeon is a(n) 80

## 2021-02-14 NOTE — PROGRESS NOTES
[facility]  Progress Note     Letty Tony Patient Status:  Inpatient    1931 MRN [id number]   [facility] 3NE-A Attending Yu Gregg MD   Hosp Day # 2 PCP Lynda Acevedo MD [facility]     Chief Complaint: SOB    S: Patient feels S (PF)  20 mL Injection Once   • heparin sodium  1,000 Units Other Once   • folic acid  1 mg Oral Daily   • Levothyroxine Sodium  75 mcg Oral Before breakfast   • Donepezil HCl  10 mg Oral Nightly   • Metoprolol Succinate ER  100 mg Oral Daily Beta Blocker

## 2021-02-14 NOTE — PROGRESS NOTES
02/14/21 0656   Clinical Encounter Type   Visited With Health care provider  (POLST requested and completed by pt and )   Continue Visiting No   Referral From    Referral To Physician  (POLST completed ready for authorized signature)

## 2021-02-15 NOTE — PROGRESS NOTES
[facility]  Progress Note     Orion Mckeon Patient Status:  Inpatient    1931 MRN [id number]   [facility] 3NE-A Attending Grady Mendoza MD   Hosp Day # 3 PCP Brina Moreno MD [facility]     Chief Complaint: slept good    S: Patient TROP <0.045            Imaging: Imaging data reviewed in Epic.     Medications:   • lidocaine (PF)  20 mL Injection Once   • heparin sodium  1,000 Units Other Once   • Fluticasone Propionate  1 spray Each Nare Daily   • folic acid  1 mg Oral Daily   • Rafael Dalton

## 2021-02-15 NOTE — PROGRESS NOTES
02/15/21 1238   Clinical Encounter Type   Visited With Patient and family together  (Sister)   Patient Spiritual Encounters   Spiritual Needs Father Gerardo Rodriguez from [facility] and Connie Koch plans to arrive around 12:45p today to provide Sacrament of the [address].

## 2021-02-15 NOTE — PROGRESS NOTES
Hem/Onc Inpatient Note    Patient Name: Orion Mckeon   YOB: 1931   Medical Record Number: [id number]   CSN: [id number]   Attending oncology Physician: Dr Cyndi JERNIGAN: she is very anxious about the plan for bone marrow biopsy.  Would like 23.1*   MCV 91.5 93.2 92.4   MCH 29.2 29.4 29.6   MCHC 31.9 31.6 32.0   RDW 20.9* 21.5* 21.1*   NEPRELIM 0.49* 0.48* 0.32*   WBC 1.5* 1.5* 1.2*   PLT 46.0* 39.0* 23.0*     Recent Labs   Lab 02/12/21  1348 02/13/21  0833   GLU 95 115*   BUN 19* 14   CREATSE

## 2021-02-15 NOTE — CM/SW NOTE
MSW spoke to pt's dtr, she states pt is current with [facility], MSW called and confirmed with [facility], referral started in [place].

## 2021-02-15 NOTE — PLAN OF CARE
Assumed care at 7:30 am.  Patient alert/oriented to baseline and resting in bed with alarm on. Cardiac monitoring. WAYNE JACOBSON RA. Bone marrow biopsy rescheduled for tomorrow per Dr. Edita Reyes. Patient up to chair, alarm on.   Patient ambulated on unit, new

## 2021-02-15 NOTE — PLAN OF CARE
Patient is A&O x3, not oriented to situation. Forgetful. Anxious. Impulsive, getting out of bed frequently. VSS. On tele-NSR. On RA. SOB with exertion. IV-SL. Denies any pain or discomfort. Xanax prn given for anxiety and insomnia.    Electrolyte p Monitor percentage of each meal consumed  - Identify factors contributing to decreased intake, treat as appropriate  - Assist with meals as needed  - Monitor I&O, WT and lab values  - Obtain nutritional consult as needed  - Optimize oral hygiene and moistu

## 2021-02-15 NOTE — PROGRESS NOTES
02/15/21 1416   Sacramental Encounters   Sacrament of Sick-Anointing Anointed  (Father Joel Chawla provided Sacrament of the Sick.)   Family Spiritual Encounters   Family Support During Treatment   (Family at the bedside.)

## 2021-02-16 NOTE — PROGRESS NOTES
[facility]    Progress Note    Joshua Gifford Patient Status:  Inpatient    1931 MRN [id number]   [facility] 3NE-A Attending Souleymane Mack MD   Hosp Day # 4 PCP Gerold Epp MD [facility]     Subjective:  Joshua Gifford is a(n) 80

## 2021-02-16 NOTE — PLAN OF CARE
Patient is A&O x3, not oriented to situation. Forgetful. Anxious. Impulsive, pulling at tele and getting out of bed at times. VSS. On tele-NSR. On RA. SOB with exertion. IV-SL. Denies any pain or discomfort. Electrolyte protocol.    Plan for bone nutritional intake (undernourished)  Description: INTERVENTIONS:  - Monitor percentage of each meal consumed  - Identify factors contributing to decreased intake, treat as appropriate  - Assist with meals as needed  - Monitor I&O, WT and lab values  - Obta

## 2021-02-16 NOTE — PROGRESS NOTES
[facility]  Progress Note     [facility] Patient Status:  Inpatient    1931 MRN [id number]   [facility] 3NE-A Attending Mary Myles MD   Hosp Day # 4 PCP Daniela Wheeler MD [facility]     Chief Complaint: tired    S: Patient romero 02/12/21  1348   TROP <0.045            Imaging: Imaging data reviewed in Epic.     Medications:   • Pantoprazole Sodium  40 mg Oral BID AC   • lidocaine (PF)  20 mL Injection Once   • heparin sodium  1,000 Units Other Once   • Fluticasone Propionate  1 spr

## 2021-02-16 NOTE — PROGRESS NOTES
02/16/21 1145   Clinical Encounter Type   Visited With Patient not available   Continue Visiting Yes   Responded to consult. Per RN, at this time patient is able to make her own decisions. To check back when patient is available.    remains inés

## 2021-02-16 NOTE — PLAN OF CARE
Assumed care at 0730. A&Ox3-4, forgetful, RA, NSR on tele. Anxious, impulsive. L AC SL. No c/o pain, n/v. Critical labs: WBC 0.9, platelets 17, prelim ANC 0.19, MD aware. Bone marrow biopsy today 2:45pm.  Neutropenic precautions, fall precautions in place. Monitor percentage of each meal consumed  - Identify factors contributing to decreased intake, treat as appropriate  - Assist with meals as needed  - Monitor I&O, WT and lab values  - Obtain nutritional consult as needed  - Optimize oral hygiene and moistu

## 2021-02-16 NOTE — CM/SW NOTE
MSW, Albertina Jacobo and RN discussed patient's post d/c needs in care rounds.    Bone Marrow Biopsy is planned for 2/17- had to be off brillianta for 5 days 1st.     DC PLAN:  Home with dtr  Edwin Gutierrez [facility]-referral pending in [address]. MSW called and no option to donna

## 2021-02-16 NOTE — PROGRESS NOTES
02/16/21 1019   Clinical Encounter Type   Visited With Health care provider  (RN stated that patient is not decisional at this time in order to sign POLST.   Please page [phone] for  when daughter is present in order to address POLST.)   Continue Vi

## 2021-02-16 NOTE — PROCEDURES
Procedure Note  Procedure: Bone Marrow Biopsy and Aspirate    Indication: Pancytopenia    Consent: consent for the above procedure was obtained from Orion Mckeon.  The procedure was discussed with the patient and risks of the procedure including pain a

## 2021-02-17 NOTE — PLAN OF CARE
Assumed care at 7:30 am.  Alert/oriented to baseline and resting in bed w/bed alarm on. Cardiac monitoring. SCDs. RA. PIV SL. Walked on unit w/RN in AM. Hospitalist notified-patient verbalized SI, plan to take sleeping pills.   Suicide precautions in place

## 2021-02-17 NOTE — PLAN OF CARE
Resumed care of pt. At [address]. Pt. Is Ax4, but can be forgetful and impulsive.     Awaiting bone marrow biopsy results from 02/16    RA, Tele: CPO Anna MENDOZA given via [facility]    PIV:SL      Problem: Patient/Family Goals  Goal: Patient/Family Long Term Goal  Angela hygiene and moisture  - Encourage food from home; allow for food preferences  - Enhance eating environment  Outcome: Progressing  Goal: Achieves appropriate nutritional intake (bariatric)  Description: INTERVENTIONS:  - Monitor for over-consumption  - Iden

## 2021-02-17 NOTE — PROGRESS NOTES
[facility]  Progress Note     Joshua Gifford Patient Status:  Inpatient    1931 MRN [id number]   [facility] 3NE-A Attending Souleymane Mack MD   Hosp Day # 5 PCP Artur Daniel MD [facility]     Chief Complaint: tired    S: no complaints Recent Labs   Lab 02/12/21  1348   TROP <0.045            Imaging: Imaging data reviewed in Epic.     Medications:   • Pantoprazole Sodium  40 mg Oral BID AC   • lidocaine (PF)  20 mL Injection Once   • heparin sodium  1,000 Units Other Once   • Fluti

## 2021-02-17 NOTE — PROGRESS NOTES
[facility]    Progress Note    Eleanor Damian Patient Status:  Inpatient    1931 MRN [id number]   [facility] 3NE-A Attending Sofiya Moyer MD   Hosp Day # 5 PCP Juan Cagle     Subjective:  Eleanor Damian is a(n) 80

## 2021-02-17 NOTE — PROGRESS NOTES
02/16/21 2006   Clinical Encounter Type   Visited With Health care provider  ( received completed Polst form 2/14 and entered it into the electronic chart.)   Routine Visit Follow-up   Continue Visiting No

## 2021-02-17 NOTE — PROGRESS NOTES
PSYCH CONSULT    Date of Admission: 2/12/21  Date of Consult: 2/17/21  Reason for Consultation: Suicidal ideation    Impression:  Primary Psychiatric Diagnosis:  Suicidal ideation with thoughts of overdosing on sleeping pills x 2 yrs.  She has NO intention

## 2021-02-17 NOTE — CM/SW NOTE
MSW spoke with Ilia Hidalgo at [facility] [phone] and confirmed that the patient is current with [facility] services with them. Aidin referral is still pending, however, it is not going through. Fax is busy. Will need to continue to try.     SHANI OrlandoW

## 2021-02-18 NOTE — CM/SW NOTE
katie contacted MD Butler regarding dc planning who explains that pt can be medically cleared for DC tomorrow. katie met with pt and two dtrs at bedside and confirmed they have agreed and signed consents with hospice.  katie explained that this worker spoke to the

## 2021-02-18 NOTE — PROGRESS NOTES
[facility]    Progress Note    Cosmo Nissen Patient Status:  Inpatient    1931 MRN [id number]   [facility] 3NE-A Attending Beltran Bocanegra MD   Hosp Day # 6 PCP Anselmo Obrien     Subjective:  Cosmo Nissen is a(n) 80 interested in chemotherapy. We also discussed possible palliative care with transfusions. We can likely prolong her life by a month or two with transfusions. Finally, we discussed hospice. With hospice care, we would focus completely on comfort.  I expect t

## 2021-02-18 NOTE — DIETARY NOTE
[facility]   CLINICAL NUTRITION    Elvera N Severino     Admitting diagnosis:  [facility] [D61.818]    Ht: 147.3 cm (4' 10\")  Wt: 54.4 kg (120 lb). Body mass index is 25.08 kg/m².   IBW: 43.2 kg    Labs/Meds reviewed    Diet: Orders Placed T

## 2021-02-18 NOTE — CM/SW NOTE
Hospice SW called daughter to schedule meeting ; offered 10:30 or 1:30/2:00 . Daughter took 2pm meeting.

## 2021-02-18 NOTE — PROGRESS NOTES
[facility]  Report of Psychiatric Progress Note    Fernanda Wells Patient Status:  Inpatient    1931 MRN [id number]   [facility] 3NE-A Attending Luke Sousa MD   Hosp Day # 6 PCP Mayra Saini Meth     Date of Admission:  symptoms such as sadness, low energy and motivation, and an inability to find pleasure in anything. She has hopelessness and suicidal ideation with thoughts of overdosing on meds.  BUT she has NO intention of ever hurting herself because she is Nondenominational and unspecified hyperlipidemia    • Sjogren's disease ([facility] [address]    • Skin cancer      Past Surgical History:   Procedure Laterality Date   • APPENDECTOMY     • CORRECT BUNION,SIMPLE       No family history on file. reports that she has quit smoking.  She has neve to assess    Insight: unable to assess  Judgment: unable to assess    Laboratory Data:  Lab Results   Component Value Date    WBC 0.9 02/18/2021    HGB 7.3 02/18/2021    HCT 22.6 02/18/2021    PLT 15.0 02/18/2021

## 2021-02-18 NOTE — PHYSICAL THERAPY NOTE
PHYSICAL THERAPY EVALUATION - INPATIENT     Room Number: 6030/4254-H  Evaluation Date: 2/18/2021  Type of Evaluation: Initial  Physician Order: PT Eval and Treat    Presenting Problem: pancytopenia  Reason for Therapy: Mobility Dysfunction and Discha right;Bed/chair alarm(history right shoulder pain)  Fall Risk: High fall risk    WEIGHT BEARING RESTRICTION  Weight Bearing Restriction: None                PAIN ASSESSMENT  Rating: (does not rate)  Location: Right shoulder  Management Techniques:  Body mec walker  Pattern: Shuffle  Stoop/Curb Assistance: Not tested       Skilled Therapy Provided: Pt presents semi-reclined in bed, agreeable to PT. Agreeable to ambulate in chaney. Dtr present and assists with history taking.  Pt completes semi-reclined to sit wit Based on this evaluation, patient's clinical presentation is stable and overall the evaluation complexity is considered low.   These impairments and comorbidities manifest themselves as functional limitations in independent bed mobility, transfers, gait sto

## 2021-02-18 NOTE — PLAN OF CARE
Pt is aox4 but very forgetful. Xanax given, but kept asking when we would be giving it to her. Pt slept comfortably through night. Medicated per orders. IV saline locked. NSR on tele.    Problem: Patient/Family Goals  Goal: Patient/Family Long Term Goal hygiene and moisture  - Encourage food from home; allow for food preferences  - Enhance eating environment  Outcome: Progressing  Goal: Achieves appropriate nutritional intake (bariatric)  Description: INTERVENTIONS:  - Monitor for over-consumption  - Iden

## 2021-02-18 NOTE — PLAN OF CARE
Assumed care at 7:30 am. Patient alert to baseline and resting in bed with bed alarm on and call light w/in reach. PIV SL. Cardiac monitoring. RA. Dr. Bryan Redding notified of lab results on unit. Daughter at bedside. Hospice consulted.   Patient up to [facility]

## 2021-02-18 NOTE — PROGRESS NOTES
[facility] met with daughters and patient to discuss hospice services and GOC. Consents signed daughter requested a day to prepare home for equipment. Plan for discharge saturday. Sallie Escobar RN aware.     Lokesh Keep  Residential liaison

## 2021-02-18 NOTE — PROGRESS NOTES
[facility]  Progress Note     Yayaconner Wahl Patient Status:  Inpatient    1931 MRN [id number]   [facility] 3NE-A Attending Tamara Sanchez MD   Hosp Day # 6 PCP Susan George MD [facility]     Chief Complaint: tired    S: no complaints 1.25*       Recent Labs   Lab 02/12/21  1348   TROP <0.045            Imaging: Imaging data reviewed in Epic.     Medications:   • Pantoprazole Sodium  40 mg Oral BID AC   • lidocaine (PF)  20 mL Injection Once   • heparin sodium  1,000 Units Other Once   •

## 2021-02-18 NOTE — CM/SW NOTE
3:34pm  MSW spoke to [address] [place] [address] from Hospice who states family requesting dc on saturday so they can make space. MSW will page MD to see if pt is medically cleared. 10:52am  MSW updated by Rn that family wants hospice.  MSW spoke to residential hospice  wi

## 2021-02-19 NOTE — PROGRESS NOTES
02/19/21 0949   Clinical Encounter Type   Visited With Patient   Continue Visiting No   Crisis Visit   (Patient is going into hospice at daughter's home tomorrow.)   Patient Spiritual Encounters   Spiritual Needs Patient feels end of life is near and ha

## 2021-02-19 NOTE — PROGRESS NOTES
Hem/Onc Inpatient Note    Patient Name: Aisha Quevedo   YOB: 1931   Medical Record Number: [id number]   CSN: [id number]   Attending oncology Physician: Dr Bryan JERNIGAN: feels much more fatigued today.  More GUTIERRES, required 1 L O2 over night 02/18/21  0807   RBC 2.44* 2.40* 2.46*   HGB 7.2* 7.1* 7.3*   HCT 22.4* 21.9* 22.6*   MCV 91.8 91.3 91.9   MCH 29.5 29.6 29.7   MCHC 32.1 32.4 32.3   RDW 20.6* 20.0* 19.5*   NEPRELIM 0.19* 0.17* 0.18*   WBC 0.9* 0.9* 0.9*   PLT 17.0* 16.0* 15.0*     Recent

## 2021-02-19 NOTE — PROGRESS NOTES
Family met with hospice this afternoon and was told they could be discharged Saturday. I was asked to speak to patient and family this evening by CM regarding discharge tomorrow.     Hospice had mentioned to CM/ SW that all arrangements could be made tomor

## 2021-02-19 NOTE — PROGRESS NOTES
[facility]  Progress Note     Nonda Jungling Patient Status:  Inpatient    1931 MRN [id number]   [facility] 3NE-A Attending Katrina Oneill MD   Hosp Day # 7 PCP Reji Kim MD [facility]     Chief Complaint: tired    S: no complaints 1.25*       Recent Labs   Lab 02/12/21  1348   TROP <0.045            Imaging: Imaging data reviewed in Epic.     Medications:   • Pantoprazole Sodium  40 mg Oral BID AC   • lidocaine (PF)  20 mL Injection Once   • heparin sodium  1,000 Units Other Once   •

## 2021-02-19 NOTE — PLAN OF CARE
Assumed care at [facility]. Pt is A&O x4, fatigued and forgetful at times. Received xanax PRN to help patient sleep. Slept on and off last night. On Ra, . Nsr on tele. Voids, continent. Electrolyte protocol. Denies pain. Regular diet.  Medically cleared, possi

## 2021-02-20 NOTE — PLAN OF CARE
Assumed pt care at 0730. General diet. Electrolyte protocol. A/O x4, forgetful. 1-2L throughout the day. NSR on tele. Mixed continence. Pull up on. No pain. Xanax give at 1800. Up with walker and contact guard. Plan to discharge in the morning.  Will contin Monitor I&O, WT and lab values  - Obtain nutritional consult as needed  - Optimize oral hygiene and moisture  - Encourage food from home; allow for food preferences  - Enhance eating environment  Outcome: Progressing  Goal: Achieves appropriate nutritional

## 2021-02-20 NOTE — PLAN OF CARE
NURSING DISCHARGE NOTE    Discharged Home via Wheelchair. Accompanied by Family member and Support staff  Belongings Taken by patient/family. Reviewed med rec and discharge instructions with pt and daughter- verbalized understanding.  Removed PIV an consumed  - Identify factors contributing to decreased intake, treat as appropriate  - Assist with meals as needed  - Monitor I&O, WT and lab values  - Obtain nutritional consult as needed  - Optimize oral hygiene and moisture  - Encourage food from home; Consider OT/PT consult to assist with strengthening/mobility  - Encourage toileting schedule  Outcome: Progressing

## 2021-02-20 NOTE — PLAN OF CARE
Pt A&Ox4 forgetful at times. Room Air  Resting in bed  Meds given per Mar  Denies pain at this time  Voids 1x Assist w/ walker  Pt Medially cleared, will be discharged 2/20 pt daughter will taking pt home on Hospice.    Voids, continent  Safety precaution meals as needed  - Monitor I&O, WT and lab values  - Obtain nutritional consult as needed  - Optimize oral hygiene and moisture  - Encourage food from home; allow for food preferences  - Enhance eating environment  Outcome: Progressing  Goal: Achieves appr schedule  Outcome: Progressing

## 2021-02-20 NOTE — DISCHARGE SUMMARY
[facility]  DISCHARGE SUMMARY     [facility] Patient Status:  Inpatient    1931 MRN [id number]   [facility] 3NE-A Attending Pepito Garcia MD   Hosp Day # 8 PCP Daniela Carvajal     Date of Admission: 2021  Date of results    Consultants:  • Hematology/ Oncology    Discharge Medication List:     Discharge Medications      START taking these medications      Instructions Prescription details   HYDROcodone-acetaminophen 5-325 MG Tabs  Commonly known as: Anitra Lara      Take guarding. Neurologic: No focal neurological deficits. Musculoskeletal: Moves all extremities. Extremities: No edema.   -----------------------------------------------------------------------------------------------  PATIENT DISCHARGE INSTRUCTIONS: See e

## 2021-02-22 NOTE — TELEPHONE ENCOUNTER
Lia Bermudez from residential hospice  says the patient was admitted to routine hospice care at home 2/20 following discharge from [facility]. She thanks you for the privilege of caring for this patient.

## 2021-03-01 NOTE — TELEPHONE ENCOUNTER
After hours message- Skye Priest has a couple questions regarding her mother. Pt's daughter would like lab results. Pt's daughter informed. Also my chart message sent.

## 2021-03-01 NOTE — TELEPHONE ENCOUNTER
Rajan Herrmann calling back wants further information that shows that mom has diagnosis of leukemia. She does have acces to my chart but does not see it anywhere.  magnolia

## 2021-03-15 ENCOUNTER — TELEPHONE (OUTPATIENT)
Dept: HEMATOLOGY/ONCOLOGY | Facility: HOSPITAL | Age: 86
End: 2021-03-15

## 2021-03-15 NOTE — TELEPHONE ENCOUNTER
Brina says Branden Ma passed away peacefully 3/13 with family present. She thanks you for the privilege of caring for this patient, and offers condolences.
